# Patient Record
Sex: FEMALE | Race: AMERICAN INDIAN OR ALASKA NATIVE
[De-identification: names, ages, dates, MRNs, and addresses within clinical notes are randomized per-mention and may not be internally consistent; named-entity substitution may affect disease eponyms.]

---

## 2022-01-16 ENCOUNTER — HOSPITAL ENCOUNTER (EMERGENCY)
Dept: HOSPITAL 5 - ED | Age: 47
LOS: 1 days | Discharge: HOME | End: 2022-01-17
Payer: SELF-PAY

## 2022-01-16 DIAGNOSIS — S62.202A: Primary | ICD-10-CM
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
## 2022-01-16 DIAGNOSIS — S43.004A: ICD-10-CM
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
## 2022-01-16 PROCEDURE — 72125 CT NECK SPINE W/O DYE: CPT

## 2022-01-16 PROCEDURE — 23650 CLTX SHO DSLC W/MNPJ WO ANES: CPT

## 2022-01-16 PROCEDURE — 73030 X-RAY EXAM OF SHOULDER: CPT

## 2022-01-16 PROCEDURE — 73090 X-RAY EXAM OF FOREARM: CPT

## 2022-01-16 PROCEDURE — 73130 X-RAY EXAM OF HAND: CPT

## 2022-01-16 PROCEDURE — 70450 CT HEAD/BRAIN W/O DYE: CPT

## 2022-01-16 PROCEDURE — 99284 EMERGENCY DEPT VISIT MOD MDM: CPT

## 2022-01-16 PROCEDURE — 73020 X-RAY EXAM OF SHOULDER: CPT

## 2022-01-16 PROCEDURE — 72100 X-RAY EXAM L-S SPINE 2/3 VWS: CPT

## 2022-01-16 PROCEDURE — 96375 TX/PRO/DX INJ NEW DRUG ADDON: CPT

## 2022-01-16 PROCEDURE — 96374 THER/PROPH/DIAG INJ IV PUSH: CPT

## 2022-01-16 NOTE — EMERGENCY DEPARTMENT REPORT
Blank Doc





- Documentation


Documentation: 





46-year-old female that presents with headache, neck pain, lower back pain, left

hip pain, left thumb pain and right shoulder pain status post fall that occurred

prior to arrival.  Patient stated had a mechanical trip and fall and landed to 

her right shoulder neck and back area.  Otherwise denies any other symptoms or 

complaints.





1- This is a initial triage assessment/medical screening only. Full assessment 

and work-up will be completed once the patient is in proper hospital gown, ED 

bed and in a private room setting.  This initial assessment/diagnostic 

orders/clinical plan/ treatment(s) is/are subject to change based on pt's health

status, clinical progression and re-assessment by fellow clinical providers in 

the ED. Further treatment and workup at subsequent clinical providers 

discretion. Patient/guardians urged not to elope from ED as their condition may 

be serious if not clinically assessed and managed. 


2-imaging studies ordered


3-cervical collar ordered and notified RN of order.








The patient was evaluated in the emergency department for symptoms described in 

the history of present illness.  He/she was evaluated in the context of the 

global COVID-19 pandemic, which necessitated consideration that the patient 

might be at risk for infection with the virus that causes COVID-19.  

Institutional protocols and algorithms that pertain to the evaluation of 

patients at risk for COVID-19 are in a state of rapid change based on 

information released by regulatory bodies including the CDC and federal and 

state organizations.  These policies and algorithms were followed during the 

patient's care in the emergency department.  Please note that these policies, 

procedures and recommendations changed on a rapid basis.

## 2022-01-16 NOTE — XRAY REPORT
XR shoulder 2+V RT



INDICATION:

pain s/p fall



COMPARISON:

None.



FINDINGS/IMPRESSION:



Anterior shoulder dislocation with associated Hill-Sachs deformity.

 



Signer Name: Neftali Andrade MD 

Signed: 1/16/2022 6:53 PM

Workstation Name: [id number]

## 2022-01-16 NOTE — EMERGENCY DEPARTMENT REPORT
HPI





- General


Chief Complaint: Fall


Time Seen by Provider: 22 18:17





- HPI


HPI: 





Reassessment 6








The patient is a 46-year-old female present with a chief complaint of pain after

fall downstairs.  The patient states today she slipped on wet stairs and fell 

down approximately 10 steps.  Patient denies loss of consciousness but states 

she landed on her right shoulder.  Patient complains of pain to the right side 

of her neck right shoulder right upper arm and left thumb.  Patient states her 

last p.o. occurred last night





ED Past Medical Hx





- Past Medical History


Previous Medical History?: Yes


Additional medical history: FIBROID TUMOR





- Surgical History


Past Surgical History?: Yes


Additional Surgical History: UTERINE FIBROID REMOVAL, 





- Family History


Family history: no significant





- Social History


Smoking Status: Never Smoker


Substance Use Type: None (Denies illicit drug use), Alcohol (Occasional)





- Medications


Home Medications: 


                                Home Medications











 Medication  Instructions  Recorded  Confirmed  Last Taken  Type


 


Cyclobenzaprine [Flexeril] 10 mg PO TID PRN #14 22  Unknown Rx


 


HYDROcodone/APAP 5-325 [[place] 1 - 2 each PO Q6HR PRN #30 tablet 22  

Unknown Rx





5/325]     














ED Review of Systems


ROS: 


Stated complaint: FALL/SHOULDER INJURY


Other details as noted in HPI





Constitutional: no symptoms reported


Eyes: denies: eye pain


ENT: denies: throat pain


Respiratory: no symptoms reported


Cardiovascular: denies: chest pain


Endocrine: no symptoms reported


Gastrointestinal: denies: abdominal pain


Genitourinary: denies: dysuria


Musculoskeletal: arthralgia, myalgia.  denies: back pain


Neurological: denies: headache





Physical Exam





- Physical Exam


Vital Signs: 


                                   Vital Signs











  22





  17:48


 


Temperature 98.4 F


 


Pulse Rate 78


 


Respiratory 20





Rate 


 


Blood Pressure 142/84





[Left] 


 


Blood Pressure 142/84





[Right] 


 


O2 Sat by Pulse 98





Oximetry 











Physical Exam: 





GENERAL: The patient is well-developed well-nourished female sitting in 

wheelchair appearing to be in moderate disc. []


HEENT: Normocephalic.  Atraumatic.  Extraocular motions are intact.  Patient has

 moist mucous membranes.


NECK: Supple.  No axial step-offs


CHEST/LUNGS: Clear to auscultation.  There is no respiratory distress noted.


HEART/CARDIOVASCULAR: Regular.  There is no tachycardia.  There is no gallop rub

 or murmur.  2+ right radial pulse


ABDOMEN: Abdomen is soft, nontender.  Patient has normal bowel sounds.  There is

 no abdominal distention.


SKIN: There is no rash.  There is no edema.  There is no diaphoresis.


NEURO: The patient is awake, alert, and oriented.  The patient is cooperative.  

The patient has no focal neurologic deficits.  The patient has normal speech.  

GCS 15


MUSCULOSKELETAL: There is tenderness palpation of the left thenar eminence.  

There is pain and decreased range of motion of the right shoulder.  





ED Course


                                   Vital Signs











  22





  17:48


 


Temperature 98.4 F


 


Pulse Rate 78


 


Respiratory 20





Rate 


 


Blood Pressure 142/84





[Left] 


 


Blood Pressure 142/84





[Right] 


 


O2 Sat by Pulse 98





Oximetry 














- Consultations


Consultation #1: 





22 


Case discussed with orthopedic surgeon Dr. Landrum- will come in to reduce 

shoulder








- Orthopedic Joint Reduction


  ** Joint #1


Consent Obtained: verbal consent


Time Out Performed: Yes


Side: right


Joint Reduction Location: shoulder


Analgesia: moderate sedation


Shoulder Technique Used (if applicable): traction/counter-traction, external 

rotation


Technique Used: traction/counter-traction


Post-Reduction Neuro Exam: intact


Post-Reduction Vascular Exam: intact


Post Reduction X-Ray Obtained: Yes


Post Reduction X-Ray Results: not reduced


Splint Applied: Yes


Patient Tolerated Procedure: no complications





ED Medical Decision Making





- Radiology Data


Radiology results: report reviewed (CT head, CT cervical spine right shoulder x-

ray #1, right shoulder x-ray #2, right shoulder x-ray #3, right shoulder x-ray 

#4), image reviewed (CT head, CT cervical, right shoulder x-ray #1, right 

shoulder x-ray #2, right shoulder x-ray #3, right shoulder x-ray #4)


interpreted by me: 





Lumbar spine x-ray-no acute fracture seen


Right shoulder x-ray-anterior shoulder dislocation.  No fracture seen


Left hand x-ray-comminuted first metacarpal shaft fracture





Right shoulder x-ray #2-dislocation persists


Right shoulder x-ray #3-dislocation persistent


Right shoulder x-ray #4-successful reduction, no fracture





[facility] [address] [place] [address] Cat

 Scan Report Signed Patient: GILDA DUNLAP MR#: [id number] : 

1975 Acct:[id number] Age/Sex: 46 / F ADM Date: 22 Loc: ED 

Attending Dr: Ordering Physician: BECKY KEMP NP Date of Service: 22 

Procedure(s): CT cervical spine wo con Accession Number(s): [id number] cc: BECKY KEMP NP CT head/brain wo con, CT cervical spine wo con INDICATION: fall with 

pain. TECHNIQUE: CT head and cervical spine without contrast. All CT scans at 

this location are performed using CT dose reduction for ALARA by means of 

automated exposure control. COMPARISON: None. FINDINGS: HEAD: BRAIN PARENCHYMA: 

No acute intracranial hemorrhage. No evidence of recent infarct. No mass effect 

or midline shift. VENTRICULAR SYSTEM/EXTRA-AXIAL SPACES: Ventricles are normal 

for age. No extra-axial fluid collection. ORBITS: Normal as visualized. SKELETAL

 SYSTEM/SOFT TISSUES: Normal bones and soft tissues. PARANASAL SINUSES/MASTOID 

AIR CELLS: No significant abnormality. CERVICAL: Alignment: Normal. No acute 

subluxation. Geographic Bone Lesion: None present. Fracture: No acute fracture. 

Degenerative Changes: Mild multilevel degenerative changes are present. Epidural

 Hematoma: Not present. Prevertebral / Paraspinal Soft Tissues: Unremarkable. 

IMPRESSION: 1. No acute intracranial abnormality. 2. No acute abnormality of the

 cervical spine. Signer Name: Shady Lucio MD Signed: 2022 7:28 PM 

Workstation Name: [email or web address]- Transcribed By: JS Dictated By: SHADY LUCIO MD Electronically Authenticated By: SHADY LUCIO MD Signed Ab

e/Time: 22 DD/DT: 22 TD/TT: 


Print





[facility] [address] [place] [address] Cat

 Scan Report Signed Patient: GILDA DUNLAP MR#: [id number] : 

1975 Acct:[id number] Age/Sex: 46 / F ADM Date: 22 Loc: ED 

Attending Dr: Ordering Physician: BECKY KEMP NP Date of Service: 22 

Procedure(s): CT head/brain wo con Accession Number(s): [id number] cc: BECKY 

BABAYEV,NP CT head/brain wo con, CT cervical spine wo con INDICATION: fall with 

pain. TECHNIQUE: CT head and cervical spine without contrast. All CT scans at 

this location are performed using CT dose reduction for ALARA by means of 

automated exposure control. COMPARISON: None. FINDINGS: HEAD: BRAIN PARENCHYMA: 

No acute intracranial hemorrhage. No evidence of recent infarct. No mass effect 

or midline shift. VENTRICULAR SYSTEM/EXTRA-AXIAL SPACES: Ventricles are normal 

for age. No extra-axial fluid collection. ORBITS: Normal as visualized. SKELETAL

 SYSTEM/SOFT TISSUES: Normal bones and soft tissues. PARANASAL SINUSES/MASTOID 

AIR CELLS: No significant abnormality. CERVICAL: Alignment: Normal. No acute 

subluxation. Geographic Bone Lesion: None present. Fracture: No acute fracture. 

Degenerative Changes: Mild multilevel degenerative changes are present. Epidural

 Hematoma: Not present. Prevertebral / Paraspinal Soft Tissues: Unremarkable. 

IMPRESSION: 1. No acute intracranial abnormality. 2. No acute abnormality of the

 cervical spine. Signer Name: Shady Lucio MD Signed: 2022 7:28 PM 

Workstation Name: [email or web address]- Transcribed By: RAMON Dictated By: SHADY LUCIO MD Electronically Authenticated By: SHADY LUCIO MD Signed 

Date/Time: 22 DD/DT: 22 TD/TT: 


Print Cancel 





[facility] [address] [place] [address] 

XRay Report Signed Patient: GILDA DUNLAP MR#: [id number] : 

1975 Acct:[id number] Age/Sex: 46 / F ADM Date: 22 Loc: ED 

Attending Dr: Ordering Physician: BECKY KEMP NP Date of Service: 22 

Procedure(s): XR spine lumbosacral 2-3V Accession Number(s): [id number] cc: BECKY KEMP NP Fluoro Time In Minutes: Lumbar spine, single frontal view HISTORY: 

Pain COMPARISON: None FINDINGS: Single frontal view demonstrates no evidence of 

acute fracture. Signer Name: Shady Lucio MD Signed: 2022 7:17 PM 

Workstation Name: [email or web address]- Transcribed By: RAMON Dictated By: SHADY LUCIO MD Electronically Authenticated By: SHADY LUCIO MD Signed 

Date/Time: 22 DD/DT: 22 TD/TT: 


Print [facility] 








[address] [address] 

XRay Report Signed Patient: GILDA DUNLAP MR#: [id number] : 

1975 Acct:[id number] Age/Sex: 46 / F ADM Date: 22 Loc: ED 

Attending Dr: Ordering Physician: BECKY KEMP NP Date of Service: 22 

Procedure(s): XR shoulder 2+V RT Accession Number(s): [id number] cc: BECKY KEMP NP Fluoro Time In Minutes: XR shoulder 2+V RT INDICATION: pain s/p fall 

COMPARISON: None. FINDINGS/IMPRESSION: Anterior shoulder dislocation with 

associated Hill-Sachs deformity. Signer Name: Neftali Andrade MD Signed: 2022 

6:53 PM Workstation Name: [id number] Transcribed By: PROSPER Dictated By: Neftali Andrade MD Electronically Authenticated By: Neftali Andrade MD Signed 

Date/Time: 22 DD/DT: 22 TD/TT: 


Print [facility] 





[address] [address] 

XRay Report Signed Patient: GILDA DUNLAP MR#: [id number] : 

1975 Acct:[id number] Age/Sex: 46 / F ADM Date: 22 Loc: ED 

Attending Dr: Ordering Physician: BECKY KEMP NP Date of Service: 22 

Procedure(s): XR hand 3+V LT Accession Number(s): [id number] cc: BECKY KEMP NP 

Fluoro Time In Minutes: Left hand, 4 views HISTORY: Pain after fall COMPARISON: 

None FINDINGS: Acute mildly displaced comminuted fracture of the proximal left 

thumb metacarpal shaft. No discrete intra-articular involvement. No joint 

subluxation. No additional fracture. Signer Name: Shady Lucio MD Signed: 

2022 7:19 PM Workstation Name: [email or web address]- Transcribed By: RAMON Dictated 

By: SHADY LUCIO MD Electronically Authenticated By: SHADY LUCIO MD Signed Date/Time: 22 DD/DT: 22 TD/TT: 


Print [address] [address] 

XRay Report Signed Patient: GILDA DUNLAP MR#: [id number] : 

1975 Acct:[id number] Age/Sex: 46 / F ADM Date: 22 Loc: ED 

Attending Dr: Ordering Physician: MYAH CALERO MD Date of Service: 22 P

rocedure(s): XR shoulder 1V RT Accession Number(s): [id number] cc: MYAH CALERO MD 

Fluoro Time In Minutes: EXAMINATION: Right shoulder radiograph, one view, 

2022 at 8:51 PM CLINICAL INFORMATION / INDICATION: Trauma. Shoulder 

dislocation. Post reduction evaluation. COMPARISON: Right shoulder radiograph, 

2022 at 6:44 PM FINDINGS: Right glenohumeral dislocation is again noted 

with the humeral head medial and inferior to the glenoid. Signer Name: Liliane Sun MD Signed: 2022 9:06 PM Workstation Name: [id number] Transcribed

 By: ALVERTO Dictated By: Liliane Sun MD Electronically Authenticated By: 

Liliane Sun MD Signed Date/Time: 22 DD/DT: 22 

TD/TT: 


[facility] [address] [address] 

XRay Report Signed Patient: GILDA DUNLAP MR#: [id number] : 

1975 Acct:[id number] Age/Sex: 46 / F ADM Date: 22 Loc: ED 

Attending Dr: Ordering Physician: MYAH CALERO MD Date of Service: 22 

Procedure(s): XR shoulder 1V RT Accession Number(s): [id number] cc: MYAH CALERO MD

 Fluoro Time In Minutes: Right shoulder, single frontal view HISTORY: Postred

uction COMPARISON: 2022 at 8:51 PM. FINDINGS: There is persistent 

dislocation of the right glenohumeral joint. Prominent Hill-Sachs deformity is 

again seen. No new skeletal abnormality. Signer Name: Shady Lucio MD 

Signed: 2022 9:37 PM Workstation Name: [email or web address]- Transcribed By: RAMON 

Dictated By: SHADY LUCIO MD Electronically Authenticated By: SHADY LUCIO MD Signed Date/Time: 22 DD/DT: 22 TD/TT: 


Print [facility] 





[address] [address] 

XRay Report Signed Patient: GILDA DUNLAP MR#: [id number] : 

1975 Acct:[id number] Age/Sex: 46 / F ADM Date: 22 Loc: ED 

Attending Dr: Ordering Physician: MYAH CALERO MD Date of Service: 22 P

rocedure(s): XR forearm RT Accession Number(s): [id number] cc: MYAH CALERO MD 

Fluoro Time In Minutes: Right forearm, 3 views HISTORY: Injury COMPARISON: None 

FINDINGS: No acute fracture. Remote ulnar styloid avulsion fracture. No joint 

malalignment. Soft tissues are unremarkable. IMPRESSION: No acute process Signer

 Name: Shady Lucio MD Signed: 2022 9:36 PM Workstation Name: [email or web address]-

 Transcribed By: RAMON Dictated By: SHADY LUCIO MD Electronically 

Authenticated By: SHADY LUCIO MD Signed Date/Time: 22 DD/DT: 

22 TD/TT: 


Print [facility] 








[address] [address] 

XRay Report Signed Patient: GILDA DUNLAP MR#: [id number] : 

1975 Acct:[id number] Age/Sex: 46 / F ADM Date: 22 Loc: ED 

Attending Dr: Ordering Physician: PROSPER LANDRUM Date of Service: 22 

Procedure(s): XR shoulder 1V RT Accession Number(s): [id number] cc: PROSPER LANDRUM 

Fluoro Time In Minutes: RIGHT SHOULDER 1 VIEW(S), 22 10:27 PM INDICATION /

 CLINICAL INFORMATION: post reduction. COMPARISON: 22 9:09 PM FINDINGS: 

BONES / JOINT(S): Successful closed reduction of anterior shoulder dislocation. 

Impaction deformity of the posterior lateral humeral head likely represent Hill-

Sachs deformity. No significant arthritis. SOFT TISSUES: No significant 

abnormality. ADDITIONAL FINDINGS: None. Signer Name: AUGUST Ferguson MD Signed: 

2022 10:51 PM Workstation Name: [id number] Transcribed By: DT Dictated 

By: Robe Ferguson MD Electronically Authenticated By: Robe Ferguson MD

 Signed Date/Time: 22 DD/DT: 22 TD/TT: 











- Differential Diagnosis


Closed head injury, ICH, cervical fracture, shoulder fracture, shoulder dis


Critical care attestation.: 


If time is entered above; I have spent that time in minutes in the direct care 

of this critically ill patient, excluding procedure time.








ED Disposition


Clinical Impression: 


 Dislocation of right shoulder joint, Fracture of metacarpal, first, left hand, 

Closed head injury





Disposition:  HOME / SELF CARE / HOMELESS


Is pt being admited?: No


Does the pt Need Aspirin: No


Condition: Stable


Instructions:  Shoulder Dislocation, Easy-to-Read


Additional Instructions: 


Return to the emergency department should you develop worsening symptoms, 

inability to tolerate food or liquids, high fever or any other concerns


Prescriptions: 


Cyclobenzaprine [Flexeril] 10 mg PO TID PRN #14


 PRN Reason: Muscle Spasm


HYDROcodone/APAP 5-325 [[place] 5/325] 1 - 2 each PO Q6HR PRN #30 tablet


 PRN Reason: Pain


Referrals: 


PRIMARY CARE,MD [Primary Care Provider] - 3-5 Days


PROSPER LANDRUM MD [Staff Physician] - 3-5 Days (Dr. Landrum is an orthopedic 

surgeon.  Please follow-up with him for further)


Time of Disposition: 00:29

## 2022-01-16 NOTE — XRAY REPORT
Lumbar spine, single frontal view



HISTORY: Pain



COMPARISON: None



FINDINGS: Single frontal view demonstrates no evidence of acute fracture.



Signer Name: Arnulfo Lucio MD 

Signed: 1/16/2022 7:17 PM

Workstation Name: [email or web address]-

## 2022-01-16 NOTE — XRAY REPORT
Right forearm, 3 views



HISTORY: Injury



COMPARISON: None



FINDINGS: No acute fracture. Remote ulnar styloid avulsion fracture. No joint malalignment. Soft tiss
ues are unremarkable.



IMPRESSION: No acute process



Signer Name: Arnulfo Lucio MD 

Signed: 1/16/2022 9:36 PM

Workstation Name: [facility]-

## 2022-01-16 NOTE — CAT SCAN REPORT
CT head/brain wo con, CT cervical spine wo con



INDICATION:

fall with pain.



TECHNIQUE: CT head and cervical spine without contrast. All CT scans at this location are performed u
sing CT dose reduction for ALARA by means of automated exposure control.



COMPARISON: 

None.



FINDINGS:



HEAD:



BRAIN PARENCHYMA: No acute intracranial hemorrhage. No evidence of recent infarct. No mass effect or 
midline shift.

VENTRICULAR SYSTEM/EXTRA-AXIAL SPACES: Ventricles are normal for age. No extra-axial fluid collection
. 

ORBITS: Normal as visualized.

SKELETAL SYSTEM/SOFT TISSUES: Normal bones and soft tissues.

PARANASAL SINUSES/MASTOID AIR CELLS: No significant abnormality.



CERVICAL:



Alignment:  Normal. No acute subluxation.

Geographic Bone Lesion:  None present.

Fracture:  No acute fracture.

Degenerative Changes: Mild multilevel degenerative changes are present.

Epidural Hematoma:  Not present.

Prevertebral / Paraspinal Soft Tissues:  Unremarkable.



IMPRESSION:

1. No acute intracranial abnormality.

2. No acute abnormality of the cervical spine.



Signer Name: Arnulfo Lucio MD 

Signed: 1/16/2022 7:28 PM

Workstation Name: [facility]-

## 2022-01-16 NOTE — XRAY REPORT
Left hand, 4 views



HISTORY: Pain after fall



COMPARISON: None



FINDINGS: Acute mildly displaced comminuted fracture of the proximal left thumb metacarpal shaft. No 
discrete intra-articular involvement. No joint subluxation. No additional fracture.



Signer Name: Arnulfo Lucio MD 

Signed: 1/16/2022 7:19 PM

Workstation Name: [facility]-

## 2022-01-16 NOTE — XRAY REPORT
EXAMINATION: Right shoulder radiograph, one view, 1/16/2022 at 8:51 PM



CLINICAL INFORMATION / INDICATION: Trauma. Shoulder dislocation. Post reduction evaluation.



COMPARISON: Right shoulder radiograph, 1/16/2022 at 6:44 PM



FINDINGS: Right glenohumeral dislocation is again noted with the humeral head medial and inferior to 
the glenoid.



Signer Name: Liliane Sun MD 

Signed: 1/16/2022 9:06 PM

Workstation Name: [id number]

## 2022-01-16 NOTE — XRAY REPORT
RIGHT SHOULDER 1 VIEW(S), 01/16/22 10:27 PM



INDICATION / CLINICAL INFORMATION: post reduction.



COMPARISON: 01/16/22 9:09 PM

 

FINDINGS:



BONES / JOINT(S): Successful closed reduction of anterior shoulder dislocation. Impaction deformity o
f the posterior lateral humeral head likely represent Hill-Sachs deformity. No significant arthritis.




SOFT TISSUES: No significant abnormality.



ADDITIONAL FINDINGS: None.







Signer Name: AUGUST Ferguson MD 

Signed: 1/16/2022 10:51 PM

Workstation Name: [id number]

## 2022-01-16 NOTE — CAT SCAN REPORT
CT head/brain wo con, CT cervical spine wo con



INDICATION:

fall with pain.



TECHNIQUE: CT head and cervical spine without contrast. All CT scans at this location are performed u
sing CT dose reduction for ALARA by means of automated exposure control.



COMPARISON: 

None.



FINDINGS:



HEAD:



BRAIN PARENCHYMA: No acute intracranial hemorrhage. No evidence of recent infarct. No mass effect or 
midline shift.

VENTRICULAR SYSTEM/EXTRA-AXIAL SPACES: Ventricles are normal for age. No extra-axial fluid collection
. 

ORBITS: Normal as visualized.

SKELETAL SYSTEM/SOFT TISSUES: Normal bones and soft tissues.

PARANASAL SINUSES/MASTOID AIR CELLS: No significant abnormality.



CERVICAL:



Alignment:  Normal. No acute subluxation.

Geographic Bone Lesion:  None present.

Fracture:  No acute fracture.

Degenerative Changes: Mild multilevel degenerative changes are present.

Epidural Hematoma:  Not present.

Prevertebral / Paraspinal Soft Tissues:  Unremarkable.



IMPRESSION:

1. No acute intracranial abnormality.

2. No acute abnormality of the cervical spine.



Signer Name: Arnulfo Lucio MD 

Signed: 1/16/2022 7:28 PM

Workstation Name: [employer]-

## 2022-01-16 NOTE — XRAY REPORT
Right shoulder, single frontal view



HISTORY: Postreduction



COMPARISON: 1/16/2022 at 8:51 PM.



FINDINGS: There is persistent dislocation of the right glenohumeral joint. Prominent Hill-Sachs defor
mity is again seen. No new skeletal abnormality.



Signer Name: Arnulfo Lucio MD 

Signed: 1/16/2022 9:37 PM

Workstation Name: [facility]-

## 2022-01-17 VITALS — DIASTOLIC BLOOD PRESSURE: 77 MMHG | SYSTOLIC BLOOD PRESSURE: 121 MMHG

## 2022-01-17 NOTE — PROCEDURE NOTE
Date of procedure: 01/16/22


Pre-op diagnosis: Anterior dislocation, right shoulder


Post-op diagnosis: same


Procedure: 





This is a 46-year-old female who presented to the emergency room after a slip 

and fall in which she sustained a right shoulder dislocation subcoracoid 

anterior type.  The emergency room physician was unable to reduce the 

dislocation so orthopedics was consulted.


History and physical examination along with x-rays showed a simple subcoracoid 

anterior dislocation for the right glenohumeral joint.  Neurovascular 

examination was normal.  After the procedure was explained and consent was 

given, IV Versed was given incrementally for a total of 4 mg.


Once appropriate relaxation was achieved the arm was gently pulled 

longitudinally down the long axis of the body while the assistant provided 

minimal countertraction with a sheet in the axilla.  This produced a gentle 

palpable pop and the shoulder was reduced quite easily.  Post-reduction x-rays 

showed no evidence of any fracture or other complication as well as clearly 

normal restoration of the glenohumeral joint, right shoulder


SHe was given post-procedure instructions and to follow-up in our office in less

than 2 weeks.


Anesthesia: GETA (IV Versed (given incrementally 5 mg))